# Patient Record
Sex: MALE | Race: WHITE | Employment: FULL TIME | URBAN - METROPOLITAN AREA
[De-identification: names, ages, dates, MRNs, and addresses within clinical notes are randomized per-mention and may not be internally consistent; named-entity substitution may affect disease eponyms.]

---

## 2017-04-30 ENCOUNTER — HOSPITAL ENCOUNTER (EMERGENCY)
Facility: CLINIC | Age: 27
Discharge: HOME | End: 2017-04-30
Attending: EMERGENCY MEDICINE | Admitting: EMERGENCY MEDICINE
Payer: COMMERCIAL

## 2017-04-30 VITALS
DIASTOLIC BLOOD PRESSURE: 67 MMHG | BODY MASS INDEX: 19.37 KG/M2 | HEART RATE: 62 BPM | TEMPERATURE: 98 F | OXYGEN SATURATION: 98 % | SYSTOLIC BLOOD PRESSURE: 121 MMHG | WEIGHT: 135 LBS | RESPIRATION RATE: 16 BRPM

## 2017-04-30 DIAGNOSIS — J45.901 ASTHMA EXACERBATION: ICD-10-CM

## 2017-04-30 PROCEDURE — 99213 OFFICE O/P EST LOW 20 MIN: CPT | Performed by: EMERGENCY MEDICINE

## 2017-04-30 PROCEDURE — 99213 OFFICE O/P EST LOW 20 MIN: CPT

## 2017-04-30 PROCEDURE — 25000125 ZZHC RX 250: Performed by: EMERGENCY MEDICINE

## 2017-04-30 RX ORDER — DEXAMETHASONE SODIUM PHOSPHATE 4 MG/ML
10 VIAL (ML) INJECTION ONCE
Status: COMPLETED | OUTPATIENT
Start: 2017-04-30 | End: 2017-04-30

## 2017-04-30 RX ADMIN — DEXAMETHASONE SODIUM PHOSPHATE 10 MG: 4 INJECTION, SOLUTION INTRAMUSCULAR; INTRAVENOUS at 16:58

## 2017-04-30 NOTE — ED AVS SNAPSHOT
[facility]    [address] [address]    Phone:  [phone]    Fax:  [phone]                                       Pedro Webb   MRN: [id number]    Department:  [facility]   Date of Visit:  4/30/2017           Patient Information     Date Of Birth          1990        Your diagnoses for this visit were:     Asthma exacerbation        You were seen by Pio Nash MD.        Discharge Instructions       Use your inhalers every 4 hours as needed for the cough.  Return to the emergency department if you have worsening trouble breathing, fever, repeated vomiting, or other concerns.  Otherwise follow up with primary care for a recheck if not improved in 4-5 days.    24 Hour Appointment Hotline       To make an appointment at any [facility] clinic, call [phone] ([phone]). If you don't have a family doctor or clinic, we will help you find one. [facility] clinics are conveniently located to serve the needs of you and your family.             Review of your medicines      Our records show that you are taking the medicines listed below. If these are incorrect, please call your family doctor or clinic.        Dose / Directions Last dose taken    * albuterol (2.5 MG/3ML) 0.083% neb solution   Dose:  1 vial        Take 1 vial by nebulization every 4 hours Or use inhaler   Refills:  0        * albuterol 108 (90 BASE) MCG/ACT Inhaler   Commonly known as:  albuterol   Dose:  2 puff   Quantity:  1 Inhaler        Inhale 2 puffs into the lungs every 4 hours as needed for shortness of breath / dyspnea   Refills:  0        * albuterol 108 (90 BASE) MCG/ACT Inhaler   Commonly known as:  VENTOLIN HFA   Dose:  1 puff   Quantity:  1 Inhaler        Inhale 1 puff into the lungs every 6 hours as needed for shortness of breath / dyspnea or wheezing Or use neb   Refills:  0        ZOLOFT PO        Take by mouth daily   Refills:  0        *  "Notice:  This list has 3 medication(s) that are the same as other medications prescribed for you. Read the directions carefully, and ask your doctor or other care provider to review them with you.            Orders Needing Specimen Collection     None      Pending Results     No orders found from 2017 to 2017.            Pending Culture Results     No orders found from 2017 to 2017.            Test Results From Your Hospital Stay               Thank you for choosing [facility]       Thank you for choosing [facility] for your care. Our goal is always to provide you with excellent care. Hearing back from our patients is one way we can continue to improve our services. Please take a few minutes to complete the written survey that you may receive in the mail after you visit with us. Thank you!        [employer] Information     [employer] lets you send messages to your doctor, view your test results, renew your prescriptions, schedule appointments and more. To sign up, go to [email or web address] . Click on \"Log in\" on the left side of the screen, which will take you to the Welcome page. Then click on \"Sign up Now\" on the right side of the page.     You will be asked to enter the access code listed below, as well as some personal information. Please follow the directions to create your username and password.     Your access code is: [email or web address]  Expires: 2017  4:55 PM     Your access code will  in 90 days. If you need help or a new code, please call your [facility] clinic or [phone].        Care EveryWhere ID     This is your Care EveryWhere ID. This could be used by other organizations to access your [facility] medical records  [id number]        After Visit Summary       This is your record. Keep this with you and show to your community pharmacist(s) and doctor(s) at your next visit.                  "

## 2017-04-30 NOTE — DISCHARGE INSTRUCTIONS
Use your inhalers every 4 hours as needed for the cough.  Return to the emergency department if you have worsening trouble breathing, fever, repeated vomiting, or other concerns.  Otherwise follow up with primary care for a recheck if not improved in 4-5 days.

## 2017-04-30 NOTE — ED AVS SNAPSHOT
[facility] Emergency Department    [address] [address]    Phone:  [phone]    Fax:  [phone]                                       Pedro Webb   MRN: [id number]    Department:  [facility] Emergency Department   Date of Visit:  4/30/2017           After Visit Summary Signature Page     I have received my discharge instructions, and my questions have been answered. I have discussed any challenges I see with this plan with the nurse or doctor.    ..........................................................................................................................................  Patient/Patient Representative Signature      ..........................................................................................................................................  Patient Representative Print Name and Relationship to Patient    ..................................................               ................................................  Date                                            Time    ..........................................................................................................................................  Reviewed by Signature/Title    ...................................................              ..............................................  Date                                                            Time

## 2017-04-30 NOTE — ED PROVIDER NOTES
History     Chief Complaint   Patient presents with     Asthma     2 days of cough, feeling like asthma is kicking in, using inh more often, no resp distress.     HPI  Pedro Webb is a 26 year old male who presents for cough, has 2 days, has been using his inhalers with some improvement, feels like it's slowly getting worse with more tightness.  Slight runny nose and sore throat.  No fever or chills.  No vomiting, chest pain, or rash.    Past medical history includes asthma, depression  Daily medications include sertraline  Allergies include sulfisoxazole  Doesn't smoke, drink alcohol occasionally, denies illicit drug use    I have reviewed the Medications, Allergies, Past Medical and Surgical History, and Social History in the Epic system.    Review of Systems  A 4 point review of systems was performed. All pertinent positives and negatives were listed in the HPI and rest of ROS were otherwise negative.    Physical Exam   BP: 121/67  Pulse: 62  Temp: 98  F (36.7  C)  Resp: 16  Weight: 61.2 kg (135 lb)  SpO2: 98 %  Physical Exam   Constitutional: He is oriented to person, place, and time. He appears well-developed and well-nourished. No distress.   HENT:   Head: Normocephalic and atraumatic.   Right Ear: Tympanic membrane and ear canal normal.   Left Ear: Tympanic membrane and ear canal normal.   Mouth/Throat: No trismus in the jaw. No oropharyngeal exudate, posterior oropharyngeal edema, posterior oropharyngeal erythema or tonsillar abscesses.   Eyes: No scleral icterus.   Neck: Normal range of motion. Neck supple.   Pulmonary/Chest: Effort normal and breath sounds normal. No respiratory distress. He has no wheezes. He has no rales.   Neurological: He is alert and oriented to person, place, and time.   Skin: Skin is warm and dry. No rash noted. He is not diaphoretic. No erythema. No pallor.       ED Course     ED Course     Procedures             Critical Care time:  none               Labs Ordered and Resulted  from Time of ED Arrival Up to the Time of Departure from the ED - No data to display    Assessments & Plan (with Medical Decision Making)   26-year-old male who presents with cough, runny nose.  Differential includes asthma, viral URI, bronchitis, allergies.  No wheezing currently, no indication for nebs at this time.  Lungs are clear, temperature 98 F, estrogen 98% on room air, no indication for chest x-ray at this time, unlikely pneumonia.  Given oral dexamethasone and discharged to home with instructions to return if worse, otherwise follow up in primary care.  The patient is in agreement with this plan.    I have reviewed the nursing notes.    I have reviewed the findings, diagnosis, plan and need for follow up with the patient.    New Prescriptions    No medications on file       Final diagnoses:   Asthma exacerbation       4/30/2017   [facility] EMERGENCY DEPARTMENT     Pio Nash MD  04/30/17 9247

## 2017-06-03 ENCOUNTER — NURSE TRIAGE (OUTPATIENT)
Dept: NURSING | Facility: CLINIC | Age: 27
End: 2017-06-03

## 2017-06-03 NOTE — TELEPHONE ENCOUNTER
"Patient calling requesting refill of albuterol inhaler.  States, \"I was seen in the Emergency room about a month ago for asthma but I am ususally seen at [facility].  Informed patient that he should call [facility] for a refill if that is where his primary provider is located.  Patient verbalized understanding and is appreciative of information.  "

## 2017-07-03 ENCOUNTER — HOSPITAL ENCOUNTER (EMERGENCY)
Facility: CLINIC | Age: 27
Discharge: HOME | End: 2017-07-03
Attending: EMERGENCY MEDICINE | Admitting: EMERGENCY MEDICINE
Payer: COMMERCIAL

## 2017-07-03 ENCOUNTER — APPOINTMENT (OUTPATIENT)
Dept: GENERAL RADIOLOGY | Facility: CLINIC | Age: 27
End: 2017-07-03
Attending: EMERGENCY MEDICINE
Payer: COMMERCIAL

## 2017-07-03 VITALS
TEMPERATURE: 97.4 F | HEIGHT: 70 IN | SYSTOLIC BLOOD PRESSURE: 134 MMHG | OXYGEN SATURATION: 97 % | RESPIRATION RATE: 16 BRPM | BODY MASS INDEX: 20.76 KG/M2 | HEART RATE: 74 BPM | WEIGHT: 145 LBS | DIASTOLIC BLOOD PRESSURE: 75 MMHG

## 2017-07-03 DIAGNOSIS — S93.492A HIGH ANKLE SPRAIN OF LEFT LOWER EXTREMITY, INITIAL ENCOUNTER: ICD-10-CM

## 2017-07-03 DIAGNOSIS — M25.572 ACUTE LEFT ANKLE PAIN: ICD-10-CM

## 2017-07-03 PROCEDURE — 73610 X-RAY EXAM OF ANKLE: CPT | Mod: LT

## 2017-07-03 PROCEDURE — 99283 EMERGENCY DEPT VISIT LOW MDM: CPT

## 2017-07-03 PROCEDURE — 99283 EMERGENCY DEPT VISIT LOW MDM: CPT | Performed by: EMERGENCY MEDICINE

## 2017-07-03 NOTE — ED AVS SNAPSHOT
[facility] Emergency Department    [address] [address]    Phone:  [phone]    Fax:  [phone]                                       Pedro Webb   MRN: [id number]    Department:  [facility]   Date of Visit:  7/3/2017           Patient Information     Date Of Birth          1990        Your diagnoses for this visit were:     Acute left ankle pain     High ankle sprain of left lower extremity, initial encounter        You were seen by Pedro Christensen MD.        Discharge Instructions       Follow up with Kacie.          Understanding Ankle Sprain    The ankle is the joint where the leg and foot meet. Bones are held in place by connective tissue called ligaments. When ankle ligaments are stretched to the point of pain and injury, it is called an ankle sprain. A sprain can tear the ligaments. These tears can be very small but still cause pain. Ankle sprains can be mild or severe.  What causes an ankle sprain?  A sprain may occur when you twist your ankle or bend it too far. This can happen when you stumble or fall. Things that can make an ankle sprain more likely include:    Having had an ankle sprain before    Playing sports that involve running and jumping. Or playing contact sports such as football or hockey.    Wearing shoes that don t support your feet and ankles well    Having ankles with poor strength and flexibility  Symptoms of an ankle sprain  Symptoms may include:    Pain or soreness in the ankle    Swelling    Redness or bruising    Not being able to walk or put weight on the affected foot    Reduced range of motion in the ankle    A popping or tearing feeling at the time the sprain occurs    An abnormal or dislocated look to the ankle    Instability or too much range of motion in the ankle  Treatment for an ankle sprain  Treatment focuses on reducing pain and swelling, and avoiding further injury. Treatments may include:    Resting  the ankle. Avoid putting weight on it. This may mean using crutches until the sprain heals.    Prescription or over-the-counter pain medicines. These help reduce swelling and pain.    Cold packs. These help reduce pain and swelling.    Raising your ankle above your heart. This helps reduce swelling.    Wrapping the ankle with an elastic bandage or ankle brace. This helps reduce swelling and gives some support to the ankle. In rare cases, you may need a cast or boot.    Stretching and other exercises. These improve flexibility and strength.    Heat packs. These may be recommended before doing ankle exercises.  Possible complications of an ankle sprain  An ankle that has been weakened by a sprain can be more likely to have repeated sprains afterward. Doing exercises to strengthen your ankle and improve balance can reduce your risk for repeated sprains. Other possible complications are long-term (chronic) pain or an ankle that remains unstable.  When to call your healthcare provider  Call your healthcare provider right away if you have any of these:    Fever of 100.4 F (38 C) or higher, or as directed    Pain, numbness, discoloration, or coldness in the foot or toes    Pain that gets worse    Symptoms that don t get better, or get worse    New symptoms   Date Last Reviewed: 3/10/2016    0114-8720 [employer]. [address] [place] [address], [place] [address]. All rights reserved. This information is not intended as a substitute for professional medical care. Always follow your healthcare professional's instructions.          24 Hour Appointment Hotline       To make an appointment at any [facility] clinic, call [phone] ([phone]). If you don't have a family doctor or clinic, we will help you find one. [facility] clinics are conveniently located to serve the needs of you and your family.          ED Discharge Orders     Alum Crutches: Adult       Use gait belt during crutch training.                      Review of your medicines      Our records show that you are taking the medicines listed below. If these are incorrect, please call your family doctor or clinic.        Dose / Directions Last dose taken    * albuterol (2.5 MG/3ML) 0.083% neb solution   Dose:  1 vial        Take 1 vial by nebulization every 4 hours Or use inhaler   Refills:  0        * albuterol 108 (90 BASE) MCG/ACT Inhaler   Commonly known as:  albuterol   Dose:  2 puff   Quantity:  1 Inhaler        Inhale 2 puffs into the lungs every 4 hours as needed for shortness of breath / dyspnea   Refills:  0        * albuterol 108 (90 BASE) MCG/ACT Inhaler   Commonly known as:  VENTOLIN HFA   Dose:  1 puff   Quantity:  1 Inhaler        Inhale 1 puff into the lungs every 6 hours as needed for shortness of breath / dyspnea or wheezing Or use neb   Refills:  0        ZOLOFT PO        Take by mouth daily   Refills:  0        * Notice:  This list has 3 medication(s) that are the same as other medications prescribed for you. Read the directions carefully, and ask your doctor or other care provider to review them with you.            Procedures and tests performed during your visit     Ankle XR, G/E 3 views, left      Orders Needing Specimen Collection     None      Pending Results     No orders found from 7/1/2017 to 7/4/2017.            Pending Culture Results     No orders found from 7/1/2017 to 7/4/2017.            Pending Results Instructions     If you had any lab results that were not finalized at the time of your Discharge, you can call the ED Lab Result RN at [phone]. You will be contacted by this team for any positive Lab results or changes in treatment. The nurses are available 7 days a week from 10A to 6:30P.  You can leave a message 24 hours per day and they will return your call.        Test Results From Your Hospital Stay        7/3/2017 10:30 PM      Narrative     ANKLE THREE VIEWS LEFT 7/3/2017 10:14 PM     HISTORY: injury today - pain  "behind ankle - hx of multiple surgeries.    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 No acute appearing bony abnormality. There is  chronic flattening of the superior talus with widening of tibiotalar  joint.        Impression     IMPRESSION : No acute appearing abnormality    POP REHMAN MD                Thank you for choosing [facility]       Thank you for choosing [facility] for your care. Our goal is always to provide you with excellent care. Hearing back from our patients is one way we can continue to improve our services. Please take a few minutes to complete the written survey that you may receive in the mail after you visit with us. Thank you!        [employer] Information     [employer] lets you send messages to your doctor, view your test results, renew your prescriptions, schedule appointments and more. To sign up, go to [email or web address] . Click on \"Log in\" on the left side of the screen, which will take you to the Welcome page. Then click on \"Sign up Now\" on the right side of the page.     You will be asked to enter the access code listed below, as well as some personal information. Please follow the directions to create your username and password.     Your access code is: [email or web address]  Expires: 2017  4:55 PM     Your access code will  in 90 days. If you need help or a new code, please call your [facility] clinic or [phone].        Care EveryWhere ID     This is your Care EveryWhere ID. This could be used by other organizations to access your [facility] medical records  [id number]        Equal Access to Services     TESHA ASIF : Hadii sheryl garcia Somelissa, waaxda luqadaha, qaybta kaalmada adeamariyajeanine, kandy plata. So [facility] [phone].    ATENCIÓN: Si habla español, tiene a shipman disposición servicios gratuitos de asistencia lingüística. Llame al [phone].    We comply " with applicable federal civil rights laws and [place] laws. We do not discriminate on the basis of race, color, national origin, age, disability sex, sexual orientation or gender identity.            After Visit Summary       This is your record. Keep this with you and show to your community pharmacist(s) and doctor(s) at your next visit.

## 2017-07-03 NOTE — ED AVS SNAPSHOT
[facility]    [address] [address]    Phone:  [phone]    Fax:  [phone]                                       Pedro Webb   MRN: [id number]    Department:  [facility]   Date of Visit:  7/3/2017           After Visit Summary Signature Page     I have received my discharge instructions, and my questions have been answered. I have discussed any challenges I see with this plan with the nurse or doctor.    ..........................................................................................................................................  Patient/Patient Representative Signature      ..........................................................................................................................................  Patient Representative Print Name and Relationship to Patient    ..................................................               ................................................  Date                                            Time    ..........................................................................................................................................  Reviewed by Signature/Title    ...................................................              ..............................................  Date                                                            Time

## 2017-07-04 NOTE — DISCHARGE INSTRUCTIONS
Follow up with Kacie.          Understanding Ankle Sprain    The ankle is the joint where the leg and foot meet. Bones are held in place by connective tissue called ligaments. When ankle ligaments are stretched to the point of pain and injury, it is called an ankle sprain. A sprain can tear the ligaments. These tears can be very small but still cause pain. Ankle sprains can be mild or severe.  What causes an ankle sprain?  A sprain may occur when you twist your ankle or bend it too far. This can happen when you stumble or fall. Things that can make an ankle sprain more likely include:    Having had an ankle sprain before    Playing sports that involve running and jumping. Or playing contact sports such as football or hockey.    Wearing shoes that don t support your feet and ankles well    Having ankles with poor strength and flexibility  Symptoms of an ankle sprain  Symptoms may include:    Pain or soreness in the ankle    Swelling    Redness or bruising    Not being able to walk or put weight on the affected foot    Reduced range of motion in the ankle    A popping or tearing feeling at the time the sprain occurs    An abnormal or dislocated look to the ankle    Instability or too much range of motion in the ankle  Treatment for an ankle sprain  Treatment focuses on reducing pain and swelling, and avoiding further injury. Treatments may include:    Resting the ankle. Avoid putting weight on it. This may mean using crutches until the sprain heals.    Prescription or over-the-counter pain medicines. These help reduce swelling and pain.    Cold packs. These help reduce pain and swelling.    Raising your ankle above your heart. This helps reduce swelling.    Wrapping the ankle with an elastic bandage or ankle brace. This helps reduce swelling and gives some support to the ankle. In rare cases, you may need a cast or boot.    Stretching and other exercises. These improve flexibility and strength.    Heat packs. These  may be recommended before doing ankle exercises.  Possible complications of an ankle sprain  An ankle that has been weakened by a sprain can be more likely to have repeated sprains afterward. Doing exercises to strengthen your ankle and improve balance can reduce your risk for repeated sprains. Other possible complications are long-term (chronic) pain or an ankle that remains unstable.  When to call your healthcare provider  Call your healthcare provider right away if you have any of these:    Fever of 100.4 F (38 C) or higher, or as directed    Pain, numbness, discoloration, or coldness in the foot or toes    Pain that gets worse    Symptoms that don t get better, or get worse    New symptoms   Date Last Reviewed: 3/10/2016    0910-4502 [employer]. [address] [place] [address], [place] [address]. All rights reserved. This information is not intended as a substitute for professional medical care. Always follow your healthcare professional's instructions.

## 2017-07-04 NOTE — ED PROVIDER NOTES
"Chief Complaint:   Chief Complaint   Patient presents with     Ankle Pain     Pt c/o left ankle injury - c/o pain to back of ankle / achilles area.         HPI:   Pedro Webb is a 26 year old male who presents for evaluation of a left ankle injury.  The incident occurred 2 hours(s) ago, while walking and tripped on hose.  The patient was able to bear weight immediately after the injury. Pain is Sharp and Dull ache, constant, and no pain meds taken.  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  Patient has had multiple surgeries of the foot, and ankle.  He has had Achilles tendon which was detached for his congenital deformity.  Patient complains of achiness in the posterior aspect of the left ankle currently.  This radiates towards the back of the calf.    Medications:   Current Outpatient Prescriptions   Medication Sig Dispense Refill     Sertraline HCl (ZOLOFT PO) Take by mouth daily       albuterol (VENTOLIN HFA) 108 (90 BASE) MCG/ACT inhaler Inhale 1 puff into the lungs every 6 hours as needed for shortness of breath / dyspnea or wheezing Or use neb 1 Inhaler 0     albuterol (ALBUTEROL) 108 (90 BASE) MCG/ACT inhaler Inhale 2 puffs into the lungs every 4 hours as needed for shortness of breath / dyspnea 1 Inhaler 0     albuterol (PROVENTIL) (2.5 MG/3ML) 0.083% nebulizer solution Take 1 vial by nebulization every 4 hours Or use inhaler             Allergies:   Allergies   Allergen Reactions     Gantrisin [Sulfisoxazole] Unknown     Latex Swelling     Nuts          Review of Systems:  Musculoskeletal: per HPI  Skin: skin is not injured   Neuro: there are paresthesias  Heme: there is no bruising or bleeding    Physical Exam:   /75  Pulse 74  Temp 97.4  F (36.3  C) (Oral)  Resp 16  Ht 1.778 m (5' 10\")  Wt 65.8 kg (145 lb)  SpO2 97%  BMI 20.81 kg/m2  General:  healthy, alert and in no distress  Extremity: no deformity  left ankle:  posterior fibula: " non-tender                   posterior tibia:  non-tender  left midfoot: base of 5th metatarsal: non-tender                    navicular:  non-tender  Weight Bearing:  the patient is able to bear weight on the injured leg at this time.  Pulses, sensation, and toe movement normal.    Results for orders placed or performed during the hospital encounter of 07/03/17 (from the past 24 hour(s))   Ankle XR, G/E 3 views, left    Narrative    ANKLE THREE VIEWS LEFT 7/3/2017 10:14 PM     HISTORY: injury today - pain behind ankle - hx of multiple surgeries.    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 No acute appearing bony abnormality. There is  chronic flattening of the superior talus with widening of tibiotalar  joint.      Impression    IMPRESSION : No acute appearing abnormality    POP REHMAN MD          Assessment:  1. Acute left ankle pain    2. High ankle sprain of left lower extremity, initial encounter          Plan:   No acute abnormality on xray  Crutches:  weight bearing as tolerated.  Recommended rest and avoidance of activities which cause pain or swelling.  Ice for 15-20 minutes every 2-3 hrs while awake for 2 days.  Compression:  elastic wrap or ankle splint to control swelling. Do not use while sleeping.  Elevation.  Pain relief: acetominophen  or ibuprofen with food.   Ankle exercises recommended.  Follow up with primary doctor if not improved  Recommended f/up with Kacie given chronic pains and congenital issues which have been followed up with Kacie.      Condition on disposition: Stable         Pedor Christensen MD  07/03/17 [id number]

## 2017-07-04 NOTE — ED NOTES
Pt presents with ankle injury from tripping on hose and foot cracked.  Pt did fall to ground gracefully. Pain 6/10 without walking.  Pt walked with leg brace and states foot is tingly.  A&O x4, denies SOB or LOC.

## 2018-03-17 ENCOUNTER — OFFICE VISIT (OUTPATIENT)
Dept: URGENT CARE | Facility: URGENT CARE | Age: 28
End: 2018-03-17
Payer: COMMERCIAL

## 2018-03-17 VITALS
BODY MASS INDEX: 21.67 KG/M2 | OXYGEN SATURATION: 100 % | DIASTOLIC BLOOD PRESSURE: 63 MMHG | HEART RATE: 60 BPM | SYSTOLIC BLOOD PRESSURE: 109 MMHG | WEIGHT: 151 LBS | RESPIRATION RATE: 20 BRPM | TEMPERATURE: 96.3 F

## 2018-03-17 DIAGNOSIS — R07.0 THROAT PAIN: ICD-10-CM

## 2018-03-17 DIAGNOSIS — J03.90 TONSILLITIS: Primary | ICD-10-CM

## 2018-03-17 DIAGNOSIS — Z20.818 STREP THROAT EXPOSURE: ICD-10-CM

## 2018-03-17 LAB
DEPRECATED S PYO AG THROAT QL EIA: NORMAL
SPECIMEN SOURCE: NORMAL

## 2018-03-17 PROCEDURE — 87880 STREP A ASSAY W/OPTIC: CPT | Performed by: NURSE PRACTITIONER

## 2018-03-17 PROCEDURE — 99213 OFFICE O/P EST LOW 20 MIN: CPT | Performed by: NURSE PRACTITIONER

## 2018-03-17 PROCEDURE — 87081 CULTURE SCREEN ONLY: CPT | Performed by: NURSE PRACTITIONER

## 2018-03-17 RX ORDER — AMOXICILLIN 500 MG/1
500 CAPSULE ORAL 2 TIMES DAILY
Qty: 20 CAPSULE | Refills: 0 | Status: SHIPPED | OUTPATIENT
Start: 2018-03-17 | End: 2018-03-27

## 2018-03-17 NOTE — PATIENT INSTRUCTIONS
Strep culture is pending will result in 48 hours.  If it is positive and change in treatment plan will contact you.      Symptomatic treatment with fluids, rest.  May use acetaminophen, ibuprofen prn.  RTC if any worsening symptoms or if not improving.   May return to work/school after 24 hours fever free.    Follow-up with your primary care provider next week and as needed.    Indications for emergent return to emergency department discussed with patient, who verbalized good understanding and agreement.  Patient understands the limitations of today's evaluation.         Pharyngitis: Strep (Presumed)    You have pharyngitis (sore throat). The cause is thought to be the streptococcus, or strep, bacterium. Strep throat infection can cause throat pain that is worse when swallowing, aching all over, headache, and fever. The infection may be spread by coughing, kissing, or touching others after touching your mouth or nose. Antibiotic medications are given to treat the infection.  Home care    Rest at home. Drink plenty of fluids to avoid dehydration.    No work or school for the first 2 days of taking the antibiotics. After this time, you will not be contagious. You can then return to work or school if you are feeling better.     The antibiotic medication must be taken for the full 10 days, even if you feel better. This is very important to ensure the infection is treated. It is also important to prevent drug-resistant organisms from developing. If you were given an antibiotic shot, no more antibiotics are needed.    You may use acetaminophen or ibuprofen to control pain or fever, unless another medicine was prescribed for this. If you have chronic liver or kidney disease or ever had a stomach ulcer or GI bleeding, talk with your doctor before using these medicines.    Throat lozenges or a throat-numbing sprays can help reduce throat pain. Gargling with warm salt water can also help. Dissolve 1/2 teaspoon of salt in 1 8  ounce glass of warm water.     Avoid salty or spicy foods, which can irritate the throat.  Follow-up care  Follow up with your healthcare provider or our staff if you are not improving over the next week.  When to seek medical advice  Call your healthcare provider right away if any of these occur:    Fever as directed by your doctor.     New or worsening ear pain, sinus pain, or headache    Painful lumps in the back of neck    Stiff neck    Lymph nodes are getting larger    Inability to swallow liquids, excessive drooling, or inability to open mouth wide due to throat pain    Signs of dehydration (very dark urine or no urine, sunken eyes, dizziness)    Trouble breathing or noisy breathing    Muffled voice    New rash  Date Last Reviewed: 4/13/2015 2000-2017 [employer]. [address] [place] [address], [place] [address]. All rights reserved. This information is not intended as a substitute for professional medical care. Always follow your healthcare professional's instructions.

## 2018-03-17 NOTE — NURSING NOTE
"Chief Complaint   Patient presents with     Pharyngitis     Yesterday.  Throat feels irritated.  People in house have strep.         Initial /63 (BP Location: Right arm, Cuff Size: Adult Regular)  Pulse 60  Temp 96.3  F (35.7  C) (Tympanic)  Resp 20  Wt 151 lb (68.5 kg)  SpO2 100%  BMI 21.67 kg/m2 Estimated body mass index is 21.67 kg/(m^2) as calculated from the following:    Height as of 7/3/17: 5' 10\" (1.778 m).    Weight as of this encounter: 151 lb (68.5 kg).      Health Maintenance that is potentially due pending provider review:  NONE    n/a    Is there anyone who you would like to be able to receive your results? Not Applicable  If yes have patient fill out WILLIE  Hu Yeung M.A.        "

## 2018-03-17 NOTE — MR AVS SNAPSHOT
After Visit Summary   3/17/2018    Pedro Webb    MRN: [id number]           Patient Information     Date Of Birth          1990        Visit Information        Provider Department      3/17/2018 9:35 AM Vikki Aleman APRN [facility]        Today's Diagnoses     Throat pain    -  1    Strep throat exposure        Tonsillitis          Care Instructions    Strep culture is pending will result in 48 hours.  If it is positive and change in treatment plan will contact you.      Symptomatic treatment with fluids, rest.  May use acetaminophen, ibuprofen prn.  RTC if any worsening symptoms or if not improving.   May return to work/school after 24 hours fever free.    Follow-up with your primary care provider next week and as needed.    Indications for emergent return to emergency department discussed with patient, who verbalized good understanding and agreement.  Patient understands the limitations of today's evaluation.         Pharyngitis: Strep (Presumed)    You have pharyngitis (sore throat). The cause is thought to be the streptococcus, or strep, bacterium. Strep throat infection can cause throat pain that is worse when swallowing, aching all over, headache, and fever. The infection may be spread by coughing, kissing, or touching others after touching your mouth or nose. Antibiotic medications are given to treat the infection.  Home care    Rest at home. Drink plenty of fluids to avoid dehydration.    No work or school for the first 2 days of taking the antibiotics. After this time, you will not be contagious. You can then return to work or school if you are feeling better.     The antibiotic medication must be taken for the full 10 days, even if you feel better. This is very important to ensure the infection is treated. It is also important to prevent drug-resistant organisms from developing. If you were given an antibiotic shot, no more antibiotics are  needed.    You may use acetaminophen or ibuprofen to control pain or fever, unless another medicine was prescribed for this. If you have chronic liver or kidney disease or ever had a stomach ulcer or GI bleeding, talk with your doctor before using these medicines.    Throat lozenges or a throat-numbing sprays can help reduce throat pain. Gargling with warm salt water can also help. Dissolve 1/2 teaspoon of salt in 1 8 ounce glass of warm water.     Avoid salty or spicy foods, which can irritate the throat.  Follow-up care  Follow up with your healthcare provider or our staff if you are not improving over the next week.  When to seek medical advice  Call your healthcare provider right away if any of these occur:    Fever as directed by your doctor.     New or worsening ear pain, sinus pain, or headache    Painful lumps in the back of neck    Stiff neck    Lymph nodes are getting larger    Inability to swallow liquids, excessive drooling, or inability to open mouth wide due to throat pain    Signs of dehydration (very dark urine or no urine, sunken eyes, dizziness)    Trouble breathing or noisy breathing    Muffled voice    New rash  Date Last Reviewed: 4/13/2015 2000-2017 [employer]. [address] [place] [address]. All rights reserved. This information is not intended as a substitute for professional medical care. Always follow your healthcare professional's instructions.                Follow-ups after your visit        Who to contact     If you have questions or need follow up information about today's clinic visit or your schedule please contact [facility] directly at [phone].  Normal or non-critical lab and imaging results will be communicated to you by MyChart, letter or phone within 4 business days after the clinic has received the results. If you do not hear from us within 7 days, please contact the clinic through MyChart or phone. If you have  "a critical or abnormal lab result, we will notify you by phone as soon as possible.  Submit refill requests through [employer] or call your pharmacy and they will forward the refill request to us. Please allow 3 business days for your refill to be completed.          Additional Information About Your Visit        MyChart Information     [employer] lets you send messages to your doctor, view your test results, renew your prescriptions, schedule appointments and more. To sign up, go to [email or web address] . Click on \"Log in\" on the left side of the screen, which will take you to the Welcome page. Then click on \"Sign up Now\" on the right side of the page.     You will be asked to enter the access code listed below, as well as some personal information. Please follow the directions to create your username and password.     Your access code is: [email or web address]  Expires: 6/15/2018  9:57 AM     Your access code will  in 90 days. If you need help or a new code, please call your [place] clinic or [phone].        Care EveryWhere ID     This is your Care EveryWhere ID. This could be used by other organizations to access your [facility] medical records  [id number]        Your Vitals Were     Pulse Temperature Respirations Pulse Oximetry BMI (Body Mass Index)       60 96.3  F (35.7  C) (Tympanic) 20 100% 21.67 kg/m2        Blood Pressure from Last 3 Encounters:   18 109/63   17 134/75   17 121/67    Weight from Last 3 Encounters:   18 151 lb (68.5 kg)   17 145 lb (65.8 kg)   17 135 lb (61.2 kg)              We Performed the Following     Strep, Rapid Screen          Today's Medication Changes          These changes are accurate as of 3/17/18  9:57 AM.  If you have any questions, ask your nurse or doctor.               Start taking these medicines.        Dose/Directions    amoxicillin 500 MG capsule   Commonly known as:  AMOXIL   Used for:  Tonsillitis   Started by:  Vikki Aleman, " APRN CNP        Dose:  500 mg   Take 1 capsule (500 mg) by mouth 2 times daily for 10 days   Quantity:  20 capsule   Refills:  0            Where to get your medicines      These medications were sent to [id number] - [place], [place] - [address]  [address] [facility] [address]    Hours:  Closed 10-16-08 business to [facility] Phone:  [phone]     amoxicillin 500 MG capsule                Primary Care Provider Office Phone # Fax #    Sally [facility] [phone] [address] [facility] [address]        Equal Access to Services     [facility] : Hadii sheryl patrick hadaiden Somelissa, wanieves vu, qaybta kaalmajeanine villa, kandy alvares . So [facility] [phone].    ATENCIÓN: Si habla español, tiene a shipman disposición servicios gratuitos de asistencia lingüística. [facility] [phone].    We comply with applicable federal civil rights laws and [place] laws. We do not discriminate on the basis of race, color, national origin, age, disability, sex, sexual orientation, or gender identity.            Thank you!     Thank you for choosing [facility]  for your care. Our goal is always to provide you with excellent care. Hearing back from our patients is one way we can continue to improve our services. Please take a few minutes to complete the written survey that you may receive in the mail after your visit with us. Thank you!             Your Updated Medication List - Protect others around you: Learn how to safely use, store and throw away your medicines at www.disposemymeds.org.          This list is accurate as of 3/17/18  9:57 AM.  Always use your most recent med list.                   Brand Name Dispense Instructions for use Diagnosis    * albuterol (2.5 MG/3ML) 0.083% neb solution      Take 1 vial by nebulization every 4 hours Or use inhaler        * albuterol 108 (90 BASE) MCG/ACT Inhaler     PROAIR HFA    1 Inhaler    Inhale 2 puffs into the lungs every 4 hours as needed for shortness of breath / dyspnea        * albuterol 108 (90 BASE) MCG/ACT Inhaler    VENTOLIN HFA    1 Inhaler    Inhale 1 puff into the lungs every 6 hours as needed for shortness of breath / dyspnea or wheezing Or use neb    Mild intermittent asthma with status asthmaticus       amoxicillin 500 MG capsule    AMOXIL    20 capsule    Take 1 capsule (500 mg) by mouth 2 times daily for 10 days    Tonsillitis       ZOLOFT PO      Take by mouth daily        * Notice:  This list has 3 medication(s) that are the same as other medications prescribed for you. Read the directions carefully, and ask your doctor or other care provider to review them with you.

## 2018-03-17 NOTE — PROGRESS NOTES
SUBJECTIVE:   Pedro Webb  is a 27 year old male who is here today because of: Sore Throat.  The patient has had symptoms of fever and sore throat.   Onset of symptoms was 2 day ago. Course of illness is worsening.  Patient admits to exposure to illness at home or work/school.   Patient denies none  Treatment measures tried include acetaminophen, ibuprofen.    History reviewed. No pertinent past medical history.    Social History   Substance Use Topics     Smoking status: Never Smoker     Smokeless tobacco: Never Used     Alcohol use No       ROS:  CONSTITUTIONAL:NEGATIVE for fever, chills, change in weight  INTEGUMENTARY/SKIN: NEGATIVE for worrisome rashes, moles or lesions  EYES: NEGATIVE for vision changes or irritation  ENT/MOUTH: See above   RESP:NEGATIVE for significant cough or SOB  CV: NEGATIVE for chest pain, palpitations or peripheral edema  MUSCULOSKELETAL: NEGATIVE for significant arthralgias or myalgia  NEURO: NEGATIVE for weakness, dizziness or paresthesias      OBJECTIVE:   /63 (BP Location: Right arm, Cuff Size: Adult Regular)  Pulse 60  Temp 96.3  F (35.7  C) (Tympanic)  Resp 20  Wt 151 lb (68.5 kg)  SpO2 100%  BMI 21.67 kg/m2  General: healthy, alert and no distress  Eyes - conjunctivae clear.  Ears - External ears normal. Canals clear. TM's normal.  Nose/Sinuses - Nares normal.Mucosa normal. No drainage or sinus tenderness.  Oropharynx - Lips, mucosa, and tongueOr normal. Positive findings: oropharyngeal erythema, tonsillar hypertrophy exudates present,   Neck - Neck supple; Positive findings: moderate anterior cervical nodes,   Lungs - Lungs clear; no wheezing or rales.  Heart - regular rate and rhythm. No murmurs, rub.    Labs:  Results for orders placed or performed in visit on 03/17/18   Strep, Rapid Screen   Result Value Ref Range    Specimen Description Throat     Rapid Strep A Screen       NEGATIVE: No Group A streptococcal antigen detected by immunoassay, await culture report.          ASSESSMENT:     ICD-10-CM    1. Tonsillitis J03.90 amoxicillin (AMOXIL) 500 MG capsule     Beta strep group A culture   2. Throat pain R07.0 Strep, Rapid Screen     Beta strep group A culture   3. Strep throat exposure Z20.818 Strep, Rapid Screen     Beta strep group A culture         PLAN:  Patient Instructions   Strep culture is pending will result in 48 hours.  If it is positive and change in treatment plan will contact you.    Based on exposure and clinical symptoms will treat with a course of amoxicillin for tonsillitis  Symptomatic treatment with fluids, rest.  May use acetaminophen, ibuprofen prn.  RTC if any worsening symptoms or if not improving.   May return to work/school after 24 hours fever free.    Follow-up with your primary care provider next week and as needed.    Indications for emergent return to emergency department discussed with patient, who verbalized good understanding and agreement.  Patient understands the limitations of today's evaluation.         Pharyngitis: Strep (Presumed)    You have pharyngitis (sore throat). The cause is thought to be the streptococcus, or strep, bacterium. Strep throat infection can cause throat pain that is worse when swallowing, aching all over, headache, and fever. The infection may be spread by coughing, kissing, or touching others after touching your mouth or nose. Antibiotic medications are given to treat the infection.  Home care    Rest at home. Drink plenty of fluids to avoid dehydration.    No work or school for the first 2 days of taking the antibiotics. After this time, you will not be contagious. You can then return to work or school if you are feeling better.     The antibiotic medication must be taken for the full 10 days, even if you feel better. This is very important to ensure the infection is treated. It is also important to prevent drug-resistant organisms from developing. If you were given an antibiotic shot, no more antibiotics are  needed.    You may use acetaminophen or ibuprofen to control pain or fever, unless another medicine was prescribed for this. If you have chronic liver or kidney disease or ever had a stomach ulcer or GI bleeding, talk with your doctor before using these medicines.    Throat lozenges or a throat-numbing sprays can help reduce throat pain. Gargling with warm salt water can also help. Dissolve 1/2 teaspoon of salt in 1 8 ounce glass of warm water.     Avoid salty or spicy foods, which can irritate the throat.  Follow-up care  Follow up with your healthcare provider or our staff if you are not improving over the next week.  When to seek medical advice  Call your healthcare provider right away if any of these occur:    Fever as directed by your doctor.     New or worsening ear pain, sinus pain, or headache    Painful lumps in the back of neck    Stiff neck    Lymph nodes are getting larger    Inability to swallow liquids, excessive drooling, or inability to open mouth wide due to throat pain    Signs of dehydration (very dark urine or no urine, sunken eyes, dizziness)    Trouble breathing or noisy breathing    Muffled voice    New rash  Date Last Reviewed: 4/13/2015 2000-2017 [employer]. [address] [place] [address], [place] [address]. All rights reserved. This information is not intended as a substitute for professional medical care. Always follow your healthcare professional's instructions.            Vikki Aleman CNP

## 2018-03-18 LAB
BACTERIA SPEC CULT: NORMAL
SPECIMEN SOURCE: NORMAL

## 2018-12-31 ENCOUNTER — HOSPITAL ENCOUNTER (EMERGENCY)
Facility: CLINIC | Age: 28
Discharge: HOME | End: 2018-12-31
Attending: PHYSICIAN ASSISTANT | Admitting: PHYSICIAN ASSISTANT
Payer: COMMERCIAL

## 2018-12-31 VITALS
TEMPERATURE: 98.2 F | HEART RATE: 70 BPM | BODY MASS INDEX: 20.81 KG/M2 | RESPIRATION RATE: 18 BRPM | SYSTOLIC BLOOD PRESSURE: 125 MMHG | DIASTOLIC BLOOD PRESSURE: 84 MMHG | WEIGHT: 145 LBS | OXYGEN SATURATION: 99 %

## 2018-12-31 DIAGNOSIS — J02.0 ACUTE STREPTOCOCCAL PHARYNGITIS: ICD-10-CM

## 2018-12-31 LAB
INTERNAL QC OK POCT: YES
S PYO AG THROAT QL IA.RAPID: POSITIVE

## 2018-12-31 PROCEDURE — 87880 STREP A ASSAY W/OPTIC: CPT | Performed by: PHYSICIAN ASSISTANT

## 2018-12-31 PROCEDURE — G0463 HOSPITAL OUTPT CLINIC VISIT: HCPCS

## 2018-12-31 PROCEDURE — 99213 OFFICE O/P EST LOW 20 MIN: CPT | Performed by: PHYSICIAN ASSISTANT

## 2018-12-31 RX ORDER — PENICILLIN V POTASSIUM 500 MG/1
500 TABLET, FILM COATED ORAL 2 TIMES DAILY
Qty: 20 TABLET | Refills: 0 | Status: SHIPPED | OUTPATIENT
Start: 2018-12-31 | End: 2019-01-10

## 2018-12-31 ASSESSMENT — ENCOUNTER SYMPTOMS
MUSCULOSKELETAL NEGATIVE: 1
RESPIRATORY NEGATIVE: 1
SORE THROAT: 1
CONSTITUTIONAL NEGATIVE: 1

## 2018-12-31 NOTE — ED PROVIDER NOTES
History     Chief Complaint   Patient presents with     Pharyngitis     family has strep     HPI  Pedro Webb is a 28 year old male who presents with complaints of sore throat today.  Patient's entire family has strep throat currently.  Denies fevers, chills, rash, neck pain/stiffness, cough, sinus pressure, or nasal congestion.      Problem List:    Patient Active Problem List    Diagnosis Date Noted     Mild persistent asthma with exacerbation 10/31/2006     Priority: Medium     Allergic rhinitis 10/31/2006     Priority: Medium     Problem list name updated by automated process. Provider to review       FRACTURE  CLOSED   N->Z TORUS FRACTURE OF RADIUS 05/15/2006     Priority: Medium        Past Medical History:    No past medical history on file.    Past Surgical History:    No past surgical history on file.    Family History:    No family history on file.    Social History:  Marital Status:  Single [1]  Social History     Tobacco Use     Smoking status: Never Smoker     Smokeless tobacco: Never Used   Substance Use Topics     Alcohol use: No     Drug use: No        Medications:      penicillin V (VEETID) 500 MG tablet   albuterol (ALBUTEROL) 108 (90 BASE) MCG/ACT inhaler   albuterol (PROVENTIL) (2.5 MG/3ML) 0.083% nebulizer solution   albuterol (VENTOLIN HFA) 108 (90 BASE) MCG/ACT inhaler   Sertraline HCl (ZOLOFT PO)         Review of Systems   Constitutional: Negative.    HENT: Positive for sore throat.    Respiratory: Negative.    Musculoskeletal: Negative.    Skin: Negative.    All other systems reviewed and are negative.      Physical Exam   BP: 125/84  Pulse: 70  Temp: 98.2  F (36.8  C)  Resp: 18  Weight: 65.8 kg (145 lb)  SpO2: 99 %      Physical Exam   Constitutional: He is oriented to person, place, and time. He appears well-developed and well-nourished.  Non-toxic appearance. No distress.   HENT:   Head: Normocephalic and atraumatic.   Right Ear: Hearing, tympanic membrane, external ear and ear canal  normal.   Left Ear: Hearing, tympanic membrane, external ear and ear canal normal.   Nose: Nose normal. No mucosal edema or rhinorrhea.   Mouth/Throat: Uvula is midline and mucous membranes are normal. No uvula swelling. Posterior oropharyngeal erythema present. No oropharyngeal exudate, posterior oropharyngeal edema or tonsillar abscesses.   Eyes: Conjunctivae and EOM are normal. Pupils are equal, round, and reactive to light.   Neck: Normal range of motion and full passive range of motion without pain. Neck supple. No neck rigidity. Normal range of motion present.   Cardiovascular: Normal rate, regular rhythm and normal heart sounds.   Pulmonary/Chest: Effort normal and breath sounds normal. No respiratory distress. He has no wheezes. He has no rales.   Lymphadenopathy:     He has no cervical adenopathy.   Neurological: He is alert and oriented to person, place, and time.   Skin: Skin is warm and dry. No rash noted.       ED Course        Procedures      Results for orders placed or performed during the hospital encounter of 12/31/18 (from the past 24 hour(s))   Rapid strep group A screen POCT   Result Value Ref Range    Rapid Strep A Screen positive neg    Internal QC OK Yes        Medications - No data to display    Assessments & Plan (with Medical Decision Making)     Pt is a 28 year old male who presents with complaints of sore throat today.  Patient's entire family has strep throat currently.  Pt is afebrile on arrival.  Exam as above.  Rapid strep was positive.  Discussed results with patient.  Return precautions were reviewed.  Hand-outs were provided.    Patient was sent with PCN and was instructed to follow-up with PCP if no improvement in 5-7 days for continued care and management or sooner if new or worsening symptoms.  He is to return to the ED for persistent and/or worsening symptoms.  Patient expressed understanding of the diagnosis and plan and was discharged home in good condition.    I have  reviewed the nursing notes.    I have reviewed the findings, diagnosis, plan and need for follow up with the patient.       Medication List      Started    penicillin V 500 MG tablet  Commonly known as:  VEETID  500 mg, Oral, 2 TIMES DAILY, For strep throat            Final diagnoses:   Acute streptococcal pharyngitis       12/31/2018   [facility] EMERGENCY DEPARTMENT      Disclaimer:  This note consists of symbols derived from keyboarding, dictation and/or voice recognition software.  As a result, there may be errors in the script that have gone undetected.  Please consider this when interpreting information found in this chart.     Johana Lan PA-C  12/31/18 3881

## 2018-12-31 NOTE — ED AVS SNAPSHOT
[facility] Emergency Department  [address] [address]  Phone:  [phone]  Fax:  [phone]                                    Pedro Webb   MRN: [id number]    Department:  [facility] Emergency Department   Date of Visit:  12/31/2018           After Visit Summary Signature Page    I have received my discharge instructions, and my questions have been answered. I have discussed any challenges I see with this plan with the nurse or doctor.    ..........................................................................................................................................  Patient/Patient Representative Signature      ..........................................................................................................................................  Patient Representative Print Name and Relationship to Patient    ..................................................               ................................................  Date                                   Time    ..........................................................................................................................................  Reviewed by Signature/Title    ...................................................              ..............................................  Date                                               Time          22EPIC Rev 08/18

## 2019-11-07 ENCOUNTER — HOSPITAL ENCOUNTER (EMERGENCY)
Facility: CLINIC | Age: 29
Discharge: HOME | End: 2019-11-07
Attending: NURSE PRACTITIONER | Admitting: NURSE PRACTITIONER
Payer: COMMERCIAL

## 2019-11-07 VITALS
TEMPERATURE: 98.4 F | DIASTOLIC BLOOD PRESSURE: 70 MMHG | SYSTOLIC BLOOD PRESSURE: 121 MMHG | OXYGEN SATURATION: 99 % | RESPIRATION RATE: 14 BRPM

## 2019-11-07 DIAGNOSIS — J45.901 ASTHMA EXACERBATION: ICD-10-CM

## 2019-11-07 DIAGNOSIS — J06.9 VIRAL URI WITH COUGH: ICD-10-CM

## 2019-11-07 PROCEDURE — 99213 OFFICE O/P EST LOW 20 MIN: CPT | Mod: Z6 | Performed by: NURSE PRACTITIONER

## 2019-11-07 PROCEDURE — G0463 HOSPITAL OUTPT CLINIC VISIT: HCPCS

## 2019-11-07 RX ORDER — PREDNISONE 20 MG/1
TABLET ORAL
Qty: 10 TABLET | Refills: 0 | Status: SHIPPED | OUTPATIENT
Start: 2019-11-07

## 2019-11-07 RX ORDER — ALBUTEROL SULFATE 90 UG/1
2 AEROSOL, METERED RESPIRATORY (INHALATION) EVERY 6 HOURS PRN
Qty: 1 INHALER | Refills: 0 | Status: SHIPPED | OUTPATIENT
Start: 2019-11-07

## 2019-11-07 ASSESSMENT — ENCOUNTER SYMPTOMS
SHORTNESS OF BREATH: 0
NAUSEA: 0
COUGH: 1
WHEEZING: 1
VOMITING: 0
DIZZINESS: 0
HEADACHES: 0
MYALGIAS: 0
SORE THROAT: 0
FATIGUE: 0
CHILLS: 0
FEVER: 0
LIGHT-HEADEDNESS: 0

## 2019-11-07 NOTE — ED AVS SNAPSHOT
[facility] Emergency Department  [address] [address]  Phone:  [phone]  Fax:  [phone]                                    Pedro Webb   MRN: [id number]    Department:  [facility] Emergency Department   Date of Visit:  11/7/2019           After Visit Summary Signature Page    I have received my discharge instructions, and my questions have been answered. I have discussed any challenges I see with this plan with the nurse or doctor.    ..........................................................................................................................................  Patient/Patient Representative Signature      ..........................................................................................................................................  Patient Representative Print Name and Relationship to Patient    ..................................................               ................................................  Date                                   Time    ..........................................................................................................................................  Reviewed by Signature/Title    ...................................................              ..............................................  Date                                               Time          22EPIC Rev 08/18

## 2019-11-07 NOTE — DISCHARGE INSTRUCTIONS
Prednisone 40 mg daily for 5 days.  Albuterol inhaler 2 puffs every 6 hours as needed.  Return for worsening symptoms.

## 2019-11-08 NOTE — ED PROVIDER NOTES
History     Chief Complaint   Patient presents with     Cough     3 days     HPI  Pedro Webb is a 28 year old male with history of mild persistent asthma and allergic rhinitis who presents to urgent care for evaluation of cough and wheezing.  Symptoms started 3 days ago.  Denies fever or chills.  Denies chest pain or shortness of breath.  He has been using his inhaler more frequently.  No known ill contacts.    Allergies:  Allergies   Allergen Reactions     Gantrisin [Sulfisoxazole] Unknown     Latex Swelling     Nuts        Problem List:    Patient Active Problem List    Diagnosis Date Noted     Mild persistent asthma with exacerbation 10/31/2006     Priority: Medium     Allergic rhinitis 10/31/2006     Priority: Medium     Problem list name updated by automated process. Provider to review       FRACTURE  CLOSED   N->Z TORUS FRACTURE OF RADIUS 05/15/2006     Priority: Medium        Past Medical History:    History reviewed. No pertinent past medical history.    Past Surgical History:    History reviewed. No pertinent surgical history.    Family History:    No family history on file.    Social History:  Marital Status:  Single [1]  Social History     Tobacco Use     Smoking status: Never Smoker     Smokeless tobacco: Never Used   Substance Use Topics     Alcohol use: No     Drug use: No        Medications:    albuterol (PROAIR HFA/PROVENTIL HFA/VENTOLIN HFA) 108 (90 Base) MCG/ACT inhaler  predniSONE (DELTASONE) 20 MG tablet  albuterol (ALBUTEROL) 108 (90 BASE) MCG/ACT inhaler  albuterol (PROVENTIL) (2.5 MG/3ML) 0.083% nebulizer solution  albuterol (VENTOLIN HFA) 108 (90 BASE) MCG/ACT inhaler  Sertraline HCl (ZOLOFT PO)          Review of Systems   Constitutional: Negative for chills, fatigue and fever.   HENT: Positive for congestion. Negative for ear pain and sore throat.    Respiratory: Positive for cough and wheezing. Negative for shortness of breath.    Cardiovascular: Negative for chest pain.    Gastrointestinal: Negative for nausea and vomiting.   Musculoskeletal: Negative for myalgias.   Neurological: Negative for dizziness, light-headedness and headaches.       Physical Exam   BP: 121/70  Heart Rate: 67  Temp: 98.4  F (36.9  C)  Resp: 14  SpO2: 99 %      Physical Exam    GENERAL APPEARANCE: alert and oriented. NAD.   EYES: conjunctiva clear  HENT: bilateral ear canals clear, intact, and without inflammation. Right TM normal. Left TM normal. Nose normal.  Oropharynx without ulcers, erythema or lesions  NECK: supple, nontender, no lymphadenopathy  RESP: Diminished air exchange in bilateral bases.  No rales, rhonchi, or wheezing appreciated.  No tachypnea.  Speaking in full sentences.  CV: regular rates and rhythm, normal S1 S2, no murmur noted      ED Course        Procedures          No results found for this or any previous visit (from the past 24 hour(s)).    Medications - No data to display    Assessments & Plan (with Medical Decision Making)   History and exam is consistent with a viral URI with cough.  I suspect this is causing exacerbation of his asthma given his report of wheezing and increased frequency of albuterol inhaler use.  Patient be treated with a course of steroids and was provided a prescription for prednisone.  He was also provided a refill of his albuterol inhaler.  Worrisome reasons to return discussed.  I have reviewed the nursing notes.    I have reviewed the findings, diagnosis, plan and need for follow up with the patient.      Discharge Medication List as of 11/7/2019  2:35 PM      START taking these medications    Details   !! albuterol (PROAIR HFA/PROVENTIL HFA/VENTOLIN HFA) 108 (90 Base) MCG/ACT inhaler Inhale 2 puffs into the lungs every 6 hours as needed for shortness of breath / dyspnea or wheezing, Disp-1 Inhaler, R-0, E-Prescribe      predniSONE (DELTASONE) 20 MG tablet Take two tablets (= 40mg) each day for 5 (five) days, Disp-10 tablet, R-0, E-Prescribe       !! -  Potential duplicate medications found. Please discuss with provider.          Final diagnoses:   Viral URI with cough   Asthma exacerbation       11/7/2019   [facility] EMERGENCY DEPARTMENT     Fanta Martinez APRN CNP  11/07/19 2023

## 2019-12-30 ENCOUNTER — HOSPITAL ENCOUNTER (EMERGENCY)
Facility: CLINIC | Age: 29
Discharge: HOME | End: 2019-12-30
Attending: FAMILY MEDICINE | Admitting: FAMILY MEDICINE
Payer: COMMERCIAL

## 2019-12-30 VITALS
DIASTOLIC BLOOD PRESSURE: 66 MMHG | WEIGHT: 155 LBS | BODY MASS INDEX: 22.24 KG/M2 | TEMPERATURE: 97.4 F | SYSTOLIC BLOOD PRESSURE: 120 MMHG | RESPIRATION RATE: 18 BRPM | HEART RATE: 60 BPM | OXYGEN SATURATION: 100 %

## 2019-12-30 DIAGNOSIS — R20.2 PARESTHESIA OF LEFT ARM: ICD-10-CM

## 2019-12-30 DIAGNOSIS — R20.0 LEFT FACIAL NUMBNESS: ICD-10-CM

## 2019-12-30 LAB
ALBUMIN SERPL-MCNC: 4.3 G/DL (ref 3.4–5)
ALP SERPL-CCNC: 71 U/L (ref 40–150)
ALT SERPL W P-5'-P-CCNC: 37 U/L (ref 0–70)
ANION GAP SERPL CALCULATED.3IONS-SCNC: 8 MMOL/L (ref 3–14)
AST SERPL W P-5'-P-CCNC: 23 U/L (ref 0–45)
BASOPHILS # BLD AUTO: 0.1 10E9/L (ref 0–0.2)
BASOPHILS NFR BLD AUTO: 1 %
BILIRUB SERPL-MCNC: 0.8 MG/DL (ref 0.2–1.3)
BUN SERPL-MCNC: 17 MG/DL (ref 7–30)
CALCIUM SERPL-MCNC: 8.8 MG/DL (ref 8.5–10.1)
CHLORIDE SERPL-SCNC: 107 MMOL/L (ref 94–109)
CO2 SERPL-SCNC: 24 MMOL/L (ref 20–32)
CREAT SERPL-MCNC: 0.73 MG/DL (ref 0.66–1.25)
DIFFERENTIAL METHOD BLD: ABNORMAL
EOSINOPHIL # BLD AUTO: 0.1 10E9/L (ref 0–0.7)
EOSINOPHIL NFR BLD AUTO: 1.4 %
ERYTHROCYTE [DISTWIDTH] IN BLOOD BY AUTOMATED COUNT: 11.4 % (ref 10–15)
GFR SERPL CREATININE-BSD FRML MDRD: >90 ML/MIN/{1.73_M2}
GLUCOSE SERPL-MCNC: 82 MG/DL (ref 70–99)
HCT VFR BLD AUTO: 42.5 % (ref 40–53)
HGB BLD-MCNC: 15.4 G/DL (ref 13.3–17.7)
IMM GRANULOCYTES # BLD: 0 10E9/L (ref 0–0.4)
IMM GRANULOCYTES NFR BLD: 0.4 %
LYMPHOCYTES # BLD AUTO: 2.1 10E9/L (ref 0.8–5.3)
LYMPHOCYTES NFR BLD AUTO: 28.3 %
MCH RBC QN AUTO: 33.2 PG (ref 26.5–33)
MCHC RBC AUTO-ENTMCNC: 36.2 G/DL (ref 31.5–36.5)
MCV RBC AUTO: 92 FL (ref 78–100)
MONOCYTES # BLD AUTO: 0.4 10E9/L (ref 0–1.3)
MONOCYTES NFR BLD AUTO: 5.4 %
NEUTROPHILS # BLD AUTO: 4.7 10E9/L (ref 1.6–8.3)
NEUTROPHILS NFR BLD AUTO: 63.5 %
NRBC # BLD AUTO: 0 10*3/UL
NRBC BLD AUTO-RTO: 0 /100
PLATELET # BLD AUTO: 246 10E9/L (ref 150–450)
POTASSIUM SERPL-SCNC: 3.7 MMOL/L (ref 3.4–5.3)
PROT SERPL-MCNC: 8 G/DL (ref 6.8–8.8)
RBC # BLD AUTO: 4.64 10E12/L (ref 4.4–5.9)
SODIUM SERPL-SCNC: 139 MMOL/L (ref 133–144)
TROPONIN I SERPL-MCNC: <0.015 UG/L (ref 0–0.04)
TSH SERPL DL<=0.005 MIU/L-ACNC: 1.66 MU/L (ref 0.4–4)
WBC # BLD AUTO: 7.3 10E9/L (ref 4–11)

## 2019-12-30 PROCEDURE — 85025 COMPLETE CBC W/AUTO DIFF WBC: CPT | Performed by: FAMILY MEDICINE

## 2019-12-30 PROCEDURE — 84443 ASSAY THYROID STIM HORMONE: CPT | Performed by: FAMILY MEDICINE

## 2019-12-30 PROCEDURE — 80053 COMPREHEN METABOLIC PANEL: CPT | Performed by: FAMILY MEDICINE

## 2019-12-30 PROCEDURE — 99284 EMERGENCY DEPT VISIT MOD MDM: CPT | Performed by: FAMILY MEDICINE

## 2019-12-30 PROCEDURE — 93005 ELECTROCARDIOGRAM TRACING: CPT | Performed by: FAMILY MEDICINE

## 2019-12-30 PROCEDURE — 99285 EMERGENCY DEPT VISIT HI MDM: CPT | Mod: Z6 | Performed by: FAMILY MEDICINE

## 2019-12-30 PROCEDURE — 84484 ASSAY OF TROPONIN QUANT: CPT | Performed by: FAMILY MEDICINE

## 2019-12-30 NOTE — ED AVS SNAPSHOT
[facility] Emergency Department  [address] [address]  Phone:  [phone]  Fax:  [phone]                                    Pedro Webb   MRN: [id number]    Department:  [facility] Emergency Department   Date of Visit:  12/30/2019           After Visit Summary Signature Page    I have received my discharge instructions, and my questions have been answered. I have discussed any challenges I see with this plan with the nurse or doctor.    ..........................................................................................................................................  Patient/Patient Representative Signature      ..........................................................................................................................................  Patient Representative Print Name and Relationship to Patient    ..................................................               ................................................  Date                                   Time    ..........................................................................................................................................  Reviewed by Signature/Title    ...................................................              ..............................................  Date                                               Time          22EPIC Rev 08/18

## 2019-12-30 NOTE — DISCHARGE INSTRUCTIONS
Return to the Emergency Room if the following occurs:     Severely worsened weakness, new difficulty with speech, new incoordination, fainting, severe headache, or for any concern at anytime.    Or, follow-up with the following provider as we discussed:     Return to the [facility]. See contact information provided for scheduling.    Medications discussed:    None new.  No changes.    If you received pain-relieving or sedating medication during your time in the ER, avoid alcohol, driving automobiles, or working with machinery.  Also, a responsible adult must stay with you.        Call the Nurse Advice Line at [phone] or [phone] for any concern at anytime.

## 2019-12-30 NOTE — ED PROVIDER NOTES
"  HPI   Patient is a 29-year-old male presenting with numbness and tingling involving his face and arm.  He has a known history obtaining a brain MRI on 12/21/2019.  This was unremarkable.  He had a diagnosis of non-intractable episodic headaches leading into this test.  That said, the patient denies having a history of he does not smoke.  No drugs of abuse.  Rare alcohol.    The patient has been experiencing left face and left arm numbness and tingling intermittently since 12/19.  He initially felt that his left upper, mid, and lower face were numb and tingly.  This was brief in duration.  He describes having some facial pressure around the same time but this was not obviously correlated with the numbness and tingling.  He then began to experience similar numbness and tingling on the dorsum of his left.  He describes symptoms radiating into his fingers.  He tells me that his left hand \"just does not feel right.\"  Again, these were intermittent episodes lasting minutes to hours.  Today, he had similar episodes involving his face and arm.  However, he also experienced left tongue tingling.  He denies loss of facial strength or coordination.  No difficulty with speech or swallowing.  No recent trauma or injury.  No fever or illness.  No hearing changes.  No tinnitus.        Allergies:  Allergies   Allergen Reactions     Gantrisin [Sulfisoxazole] Unknown     Latex Swelling     Nuts      Problem List:    Patient Active Problem List    Diagnosis Date Noted     Mild persistent asthma with exacerbation 10/31/2006     Priority: Medium     Allergic rhinitis 10/31/2006     Priority: Medium     Problem list name updated by automated process. Provider to review       FRACTURE  CLOSED   N->Z TORUS FRACTURE OF RADIUS 05/15/2006     Priority: Medium      Past Medical History:    No past medical history on file.  Past Surgical History:    No past surgical history on file.  Family History:    No family history on file.  Social " History:  Marital Status:  Single [1]  Social History     Tobacco Use     Smoking status: Never Smoker     Smokeless tobacco: Never Used   Substance Use Topics     Alcohol use: No     Drug use: No      Medications:    albuterol (ALBUTEROL) 108 (90 BASE) MCG/ACT inhaler  albuterol (PROAIR HFA/PROVENTIL HFA/VENTOLIN HFA) 108 (90 Base) MCG/ACT inhaler  albuterol (PROVENTIL) (2.5 MG/3ML) 0.083% nebulizer solution  albuterol (VENTOLIN HFA) 108 (90 BASE) MCG/ACT inhaler  predniSONE (DELTASONE) 20 MG tablet  Sertraline HCl (ZOLOFT PO)      Review of Systems   All other systems reviewed and are negative.      PE   BP: 133/70  Pulse: 73  Temp: 97.4  F (36.3  C)  Resp: 18  Weight: 70.3 kg (155 lb)  SpO2: 99 %  Physical Exam  Vitals signs and nursing note reviewed.   Constitutional:       General: He is not in acute distress.     Appearance: He is not diaphoretic.      Comments: Pleasant, conversational.   HENT:      Head: Atraumatic.      Nose: Nose normal.      Mouth/Throat:      Mouth: Mucous membranes are moist.   Eyes:      General: No scleral icterus.     Pupils: Pupils are equal, round, and reactive to light.   Neck:      Musculoskeletal: Normal range of motion.   Cardiovascular:      Rate and Rhythm: Normal rate.      Heart sounds: Normal heart sounds.   Pulmonary:      Effort: No respiratory distress.      Breath sounds: Normal breath sounds.   Musculoskeletal: Normal range of motion.         General: No tenderness.   Skin:     General: Skin is warm.      Findings: No rash.   Neurological:      General: No focal deficit present.      Mental Status: He is alert and oriented to person, place, and time.      Comments: No dysarthria or dysphasia.  CN II-VIII intact grossly.  Moving U/L extremities B.  Strength 5/5 U/L extremities B.  Sensation intact grossly to touch (equal).  Rapid alternating movements intact.  Negative Rhomberg.  Normal finger-to-nose movments.   Psychiatric:         Behavior: Behavior normal.          ED COURSE and [facility]   1449.  The patient has left sided face and arm tingling and numbness, as above.  Low concern for acute stroke given the fact that symptom started on 12/19 and he had an MRI of his brain on 12/21 that was unremarkable.  Basic lab is pending.  EKG pending.  Will consult with neurology.  Perhaps migraine?    1608.  I spoke with the neurologist on-call for the [facility].  I provided an official referral to their clinic.  There were no additional requests at the time of seeing the patient.  No orders pending at the time of discharge.  Close follow-up with her clinic is recommended.    LABS  Labs Ordered and Resulted from Time of ED Arrival Up to the Time of Departure from the ED   CBC WITH PLATELETS DIFFERENTIAL - Abnormal; Notable for the following components:       Result Value    MCH 33.2 (*)     All other components within normal limits   COMPREHENSIVE METABOLIC PANEL   TROPONIN I   TSH WITH FREE T4 REFLEX       IMAGING  Images reviewed by me.  Radiology report also reviewed.  No orders to display       Procedures    Medications - No data to display      IMPRESSION       ICD-10-CM    1. Left facial numbness R20.0 NEUROLOGY ADULT REFERRAL   2. Paresthesia of left arm R20.2 NEUROLOGY ADULT REFERRAL            Medication List      There are no discharge medications for this visit.                       Flavio Bonner MD  12/30/19 6638

## 2019-12-31 ENCOUNTER — TRANSFERRED RECORDS (OUTPATIENT)
Dept: HEALTH INFORMATION MANAGEMENT | Facility: CLINIC | Age: 29
End: 2019-12-31

## 2020-01-14 ENCOUNTER — HOSPITAL ENCOUNTER (OUTPATIENT)
Dept: CARDIOLOGY | Facility: CLINIC | Age: 30
Discharge: HOME | End: 2020-01-14
Attending: PHYSICIAN ASSISTANT | Admitting: PHYSICIAN ASSISTANT
Payer: COMMERCIAL

## 2020-01-14 DIAGNOSIS — G43.909 MIGRAINE HEADACHE: ICD-10-CM

## 2020-01-14 PROCEDURE — 93306 TTE W/DOPPLER COMPLETE: CPT | Mod: 26 | Performed by: INTERNAL MEDICINE

## 2020-01-14 PROCEDURE — 40000264 ECHOCARDIOGRAM COMPLETE

## 2021-08-25 ENCOUNTER — TRANSFERRED RECORDS (OUTPATIENT)
Dept: HEALTH INFORMATION MANAGEMENT | Facility: CLINIC | Age: 31
End: 2021-08-25

## 2022-01-24 ENCOUNTER — NURSE TRIAGE (OUTPATIENT)
Dept: NURSING | Facility: CLINIC | Age: 32
End: 2022-01-24
Payer: COMMERCIAL

## 2022-01-24 NOTE — TELEPHONE ENCOUNTER
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    He reports he had a friend who had COVID , and developed blood clots in her lungs,  And dies.    He was advised on PE's , and DVT.s and what to be aware of.    Patient expressed understanding.    Leila Morgan RN ,  COVID 19 Nurse Triage Plan/Patient Instructions    Please be aware that novel coronavirus (COVID-19) may be circulating in the community. If you develop symptoms such as fever, cough, or SOB or if you have concerns about the presence of another infection including coronavirus (COVID-19), please contact your health care provider or visit [email or web address].     Disposition/Instructions    Home care recommended. Follow home care protocol based instructions.  Additional COVID19 information to add for patients.   How can I protect others?  If you have symptoms (fever, cough, body aches or trouble breathing): Stay home and away from others (self-isolate) until:    At least 10 days have passed since your symptoms started, And     You ve had no fever--and no medicine that reduces fever--for 1 full day (24 hours), And      Your other symptoms have resolved (gotten better).     If you don t have symptoms, but a test showed that you have COVID-19 (you tested positive):    Stay home and away from others (self-isolate). Follow the tips under \"How do I self-isolate?\" below for 10 days (20 days if you have a weak immune system).    You don't need to be retested for COVID-19 before going back to school or work. As long as you're fever-free and feeling better, you can go back to school, work and other activities after waiting the 10 or 20 days.     How do I self-isolate?    Stay in your own room, even for meals. Use your own bathroom if you can.     Stay away from others in your home. No hugging, kissing or shaking hands. No visitors.    Don t go to " work, school or anywhere else.     Clean  high touch  surfaces often (doorknobs, counters, handles, etc.). Use a household cleaning spray or wipes. You ll find a full list on the EPA website:  www.epa.gov/pesticide-registration/list-n-disinfectants-use-against-sars-cov-2.    Cover your mouth and nose with a mask, tissue or washcloth to avoid spreading germs.    Wash your hands and face often. Use soap and water.    Caregivers in these groups are at risk for severe illness due to COVID-19:  o People 65 years and older  o People who live in a nursing home or long-term care facility  o People with chronic disease (lung, heart, cancer, diabetes, kidney, liver, immunologic)  o People who have a weakened immune system, including those who:  - Are in cancer treatment  - Take medicine that weakens the immune system, such as corticosteroids  - Had a bone marrow or organ transplant  - Have an immune deficiency  - Have poorly controlled HIV or AIDS  - Are obese (body mass index of 40 or higher)  - Smoke regularly    Caregivers should wear gloves while washing dishes, handling laundry and cleaning bedrooms and bathrooms.    Use caution when washing and drying laundry: Don t shake dirty laundry, and use the warmest water setting that you can.    For more tips, go to www.cdc.gov/coronavirus/2019-ncov/downloads/10Things.pdf.    How can I take care of myself?  1. Get lots of rest. Drink extra fluids (unless a doctor has told you not to).     2. Take Tylenol (acetaminophen) for fever or pain. If you have liver or kidney problems, ask your family doctor if it s okay to take Tylenol.     Adults can take either:     650 mg (two 325 mg pills) every 4 to 6 hours, or     1,000 mg (two 500 mg pills) every 8 hours as needed.     Note: Don t take more than 3,000 mg in one day.   Acetaminophen is found in many medicines (both prescribed and over-the-counter medicines). Read all labels to be sure you don t take too much.     For children,  check the Tylenol bottle for the right dose. The dose is based on the child s age or weight.    3. If you have other health problems (like cancer, heart failure, an organ transplant or severe kidney disease): Call your specialty clinic if you don t feel better in the next 2 days.    4. Know when to call 911: Emergency warning signs include:    Trouble breathing or shortness of breath    Pain or pressure in the chest that doesn t go away    Feeling confused like you haven t felt before, or not being able to wake up    Bluish-colored lips or face    What are the symptoms of COVID-19?     The most common symptoms are cough, fever and trouble breathing.     Less common symptoms include body aches, chills, diarrhea (loose, watery poops), fatigue (feeling very tired), headache, runny nose, sore throat and loss of smell.    COVID-19 can cause severe coughing (bronchitis) and lung infection (pneumonia).    How does it spread?     The virus may spread when a person coughs or sneezes into the air. The virus can travel about 6 feet this way, and it can live on surfaces.      Common  (household disinfectants) will kill the virus.    Who is at risk?  Anyone can catch COVID-19 if they re around someone who has the virus.    How can others protect themselves?     Stay away from people who have COVID-19 (or symptoms of COVID-19).    Wash hands often with soap and water. Or, use hand  with at least 60% alcohol.    Avoid touching the eyes, nose or mouth.     Wear a face mask when you go out in public, when sick or when caring for a sick person.    Where can I get more information?     [facility]: About COVID-19: [email or web address]    CDC: What to Do If You re Sick: www.cdc.gov/coronavirus/2019-ncov/about/steps-when-sick.html    CDC: Ending Home Isolation: www.cdc.gov/coronavirus/2019-ncov/hcp/disposition-in-home-patients.html     CDC: Caring for Someone:  www.cdc.gov/coronavirus/2019-ncov/if-you-are-sick/care-for-someone.html     [facility]: Interim Guidance for Hospital Discharge to Home: www.[facility]/diseases/coronavirus/hcp/hospdischarge.pdf    [facility] clinical trials (COVID-19 research studies): [email or web address].[email or web address]-clinical-trials     Below are the COVID-19 hotlines at the [place] [facility] ([facility]). Interpreters are available.   o For health questions: Call 504-109-3404 or 1-982.734.7698 (7 a.m. to 7 p.m.)  o For questions about schools and childcare: Call 108-007-7517 or 1-655.961.7647 (7 a.m. to 7 p.m.)          Thank you for taking steps to prevent the spread of this virus.  o Limit your contact with others.  o Wear a simple mask to cover your cough.  o Wash your hands well and often.    Resources    [facility]: About COVID-19: [email or web address]    CDC: What to Do If You're Sick: www.cdc.gov/coronavirus/2019-ncov/about/steps-when-sick.html    CDC: Ending Home Isolation: www.cdc.gov/coronavirus/2019-ncov/hcp/disposition-in-home-patients.html     CDC: Caring for Someone: www.cdc.gov/coronavirus/2019-ncov/if-you-are-sick/care-for-someone.html     [facility]: Interim Guidance for Hospital Discharge to Home: www.[facility]./diseases/coronavirus/hcp/hospdischarge.pdf    [facility] clinical trials (COVID-19 research studies): [email or web address].[email or web address]-clinical-trials     Below are the COVID-19 hotlines at the [place] [facility] ([facility]). Interpreters are available.   o For health questions: Call 243-310-1599 or 1-639.337.6418 (7 a.m. to 7 p.m.)  o For questions about schools and childcare: Call 334-349-4294 or 1-287.845.6374 (7 a.m. to 7 p.m.)                 [facility] Triage/refill nurse            Reason for Disposition    [1] COVID-19 diagnosed by positive lab test AND [2] mild symptoms (e.g., cough, fever, others) AND [3] no complications or SOB    Additional Information     Negative: SEVERE difficulty breathing (e.g., struggling for each breath, speaks in single words)    Negative: Difficult to awaken or acting confused (e.g., disoriented, slurred speech)    Negative: Bluish (or gray) lips or face now    Negative: Shock suspected (e.g., cold/pale/clammy skin, too weak to stand, low BP, rapid pulse)    Negative: Sounds like a life-threatening emergency to the triager    Negative: [1] COVID-19 exposure AND [2] no symptoms    Negative: COVID-19 vaccine reaction suspected (e.g., fever, headache, muscle aches) occurring 1 to 3 days after getting vaccine    Negative: COVID-19 vaccine, questions about    Negative: [1] Lives with someone known to have influenza (flu test positive) AND [2] flu-like symptoms (e.g., cough, runny nose, sore throat, SOB; with or without fever)    Negative: [1] Adult with possible COVID-19 symptoms AND [2] triager concerned about severity of symptoms or other causes    Negative: COVID-19 and breastfeeding, questions about    Negative: SEVERE or constant chest pain or pressure (Exception: mild central chest pain, present only when coughing)    Negative: MODERATE difficulty breathing (e.g., speaks in phrases, SOB even at rest, pulse 100-120)    Negative: [1] Headache AND [2] stiff neck (can't touch chin to chest)    Negative: MILD difficulty breathing (e.g., minimal/no SOB at rest, SOB with walking, pulse <100)    Negative: Chest pain or pressure    Negative: Patient sounds very sick or weak to the triager    Negative: Fever > 103 F (39.4 C)    Negative: [1] Fever > 101 F (38.3 C) AND [2] age > 60 years    Negative: [1] Fever > 100.0 F (37.8 C) AND [2] bedridden (e.g., nursing home patient, CVA, chronic illness, recovering from surgery)    Negative: [1] COVID-19 infection suspected by caller or triager AND [2] mild symptoms (cough, fever, or others) AND [3] negative COVID-19 rapid test    Negative: Fever present > 3 days (72 hours)    Negative: [1] Fever returns after  gone for over 24 hours AND [2] symptoms worse or not improved    Negative: [1] Continuous (nonstop) coughing interferes with work or school AND [2] no improvement using cough treatment per protocol    Negative: HIGH RISK for severe COVID complications (e.g., age > 64 years, obesity with BMI > 25, pregnant, chronic lung disease or other chronic medical condition)  (Exception: Already seen by PCP and no new or worsening symptoms.)    Negative: [1] HIGH RISK patient AND [2] influenza is widespread in the community AND [3] ONE OR MORE respiratory symptoms: cough, sore throat, runny or stuffy nose    Negative: [1] HIGH RISK patient AND [2] influenza exposure within the last 7 days AND [3] ONE OR MORE respiratory symptoms: cough, sore throat, runny or stuffy nose    Negative: [1] COVID-19 diagnosed by positive lab test AND [2] NO symptoms (e.g., cough, fever, others)    Negative: Cough present > 3 weeks    Protocols used: CORONAVIRUS (COVID-19) DIAGNOSED OR [id number] 8.25.2021

## 2022-08-19 ENCOUNTER — OFFICE VISIT (OUTPATIENT)
Dept: URGENT CARE | Facility: URGENT CARE | Age: 32
End: 2022-08-19
Payer: COMMERCIAL

## 2022-08-19 VITALS
WEIGHT: 162 LBS | BODY MASS INDEX: 23.24 KG/M2 | OXYGEN SATURATION: 98 % | TEMPERATURE: 97.5 F | SYSTOLIC BLOOD PRESSURE: 115 MMHG | HEART RATE: 57 BPM | DIASTOLIC BLOOD PRESSURE: 65 MMHG
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
## 2022-08-19 DIAGNOSIS — S00.81XA CAT SCRATCH OF FACE, INITIAL ENCOUNTER: Primary | ICD-10-CM

## 2022-08-19 PROCEDURE — 99203 OFFICE O/P NEW LOW 30 MIN: CPT | Performed by: FAMILY MEDICINE

## 2022-08-19 RX ORDER — FLUTICASONE PROPIONATE AND SALMETEROL 100; 50 UG/1; UG/1
1 POWDER RESPIRATORY (INHALATION)
COMMUNITY
Start: 2022-07-19

## 2022-08-19 RX ORDER — AZELASTINE 1 MG/ML
2 SPRAY, METERED NASAL
COMMUNITY
Start: 2022-08-03

## 2022-08-19 RX ORDER — SERTRALINE HYDROCHLORIDE 100 MG/1
100 TABLET, FILM COATED ORAL
COMMUNITY
Start: 2019-02-27

## 2022-08-19 NOTE — PROGRESS NOTES
SUBJECTIVE:  Pedro Webb is a 31 year old male who presents with a chief complaint of an animal bite on the face.  He was bitten by a cat just prior to arrival.   Cicumstances of bite: unprovoked attack.  Severity: mild.  Animal's immunizations up to date   Associated symptoms: immediate pain  The benjamin swiped his eye today not the cornea but her claw did catch on the lower left lid     last tetanus booster within 10 years    No past medical history on file.    Current Outpatient Medications:      albuterol (ALBUTEROL) 108 (90 BASE) MCG/ACT inhaler, Inhale 2 puffs into the lungs every 4 hours as needed for shortness of breath / dyspnea, Disp: 1 Inhaler, Rfl: 0     albuterol (PROVENTIL) (2.5 MG/3ML) 0.083% nebulizer solution, Take 1 vial by nebulization every 4 hours Or use inhaler , Disp: , Rfl:      azelastine (ASTELIN) 0.1 % nasal spray, Spray 2 sprays in nostril, Disp: , Rfl:      fluticasone-salmeterol (ADVAIR) 100-50 MCG/ACT inhaler, Inhale 1 puff into the lungs, Disp: , Rfl:      predniSONE (DELTASONE) 20 MG tablet, Take two tablets (= 40mg) each day for 5 (five) days, Disp: 10 tablet, Rfl: 0     sertraline (ZOLOFT) 100 MG tablet, Take 100 mg by mouth, Disp: , Rfl:      albuterol (PROAIR HFA/PROVENTIL HFA/VENTOLIN HFA) 108 (90 Base) MCG/ACT inhaler, Inhale 2 puffs into the lungs every 6 hours as needed for shortness of breath / dyspnea or wheezing (Patient not taking: Reported on 8/19/2022), Disp: 1 Inhaler, Rfl: 0     albuterol (VENTOLIN HFA) 108 (90 BASE) MCG/ACT inhaler, Inhale 1 puff into the lungs every 6 hours as needed for shortness of breath / dyspnea or wheezing Or use neb (Patient not taking: Reported on 8/19/2022), Disp: 1 Inhaler, Rfl: 0     Sertraline HCl (ZOLOFT PO), Take by mouth daily (Patient not taking: Reported on 8/19/2022), Disp: , Rfl:   Social History     Tobacco Use     Smoking status: Never Smoker     Smokeless tobacco: Never Used   Substance Use Topics     Alcohol use: No        ROS:  Review of systems negative except as stated above.    OBJECTIVE:  /65   Pulse 57   Temp 97.5  F (36.4  C) (Tympanic)   Wt 73.5 kg (162 lb)   SpO2 98%   BMI 23.24 kg/m    GENERAL: healthy, alert no acute distress  SKIN: abrasion, scratch on nose  erythema and swelling of left lower eyelid   EYES: EOMI,  PERRL, conjunctiva clear  MS:extremities normal- no gross deformities noted,  FROM noted in all extremities  NEURO: Normal strength and tone, sensory exam grossly normal,  normal speech and mentation    ASSESSMENT:  Animal scratch   1. Cat scratch of face, initial encounter    - amoxicillin-clavulanate (AUGMENTIN) 875-125 MG tablet; Take 1 tablet by mouth 2 times daily for 7 days  Dispense: 14 tablet; Refill: 0    Patient Instructions   If worsening , please return     Shweta De La Paz M.D.